# Patient Record
Sex: FEMALE | Race: WHITE | NOT HISPANIC OR LATINO | Employment: OTHER | URBAN - METROPOLITAN AREA
[De-identification: names, ages, dates, MRNs, and addresses within clinical notes are randomized per-mention and may not be internally consistent; named-entity substitution may affect disease eponyms.]

---

## 2017-01-16 ENCOUNTER — CONVERSION ENCOUNTER (OUTPATIENT)
Dept: CARDIOLOGY | Facility: CLINIC | Age: 82
End: 2017-01-16

## 2017-02-27 ENCOUNTER — CONVERSION ENCOUNTER (OUTPATIENT)
Dept: CARDIOLOGY | Facility: CLINIC | Age: 82
End: 2017-02-27

## 2017-03-27 ENCOUNTER — CONVERSION ENCOUNTER (OUTPATIENT)
Dept: CARDIOLOGY | Facility: CLINIC | Age: 82
End: 2017-03-27

## 2017-07-24 ENCOUNTER — CONVERSION ENCOUNTER (OUTPATIENT)
Dept: CARDIOLOGY | Facility: CLINIC | Age: 82
End: 2017-07-24

## 2018-04-11 ENCOUNTER — CONVERSION ENCOUNTER (OUTPATIENT)
Dept: CARDIOLOGY | Facility: CLINIC | Age: 83
End: 2018-04-11

## 2018-05-09 ENCOUNTER — CONVERSION ENCOUNTER (OUTPATIENT)
Dept: CARDIOLOGY | Facility: CLINIC | Age: 83
End: 2018-05-09

## 2018-05-22 ENCOUNTER — CONVERSION ENCOUNTER (OUTPATIENT)
Dept: CARDIOLOGY | Facility: CLINIC | Age: 83
End: 2018-05-22

## 2018-05-22 ENCOUNTER — HOSPITAL ENCOUNTER (OUTPATIENT)
Dept: ORTHOPEDIC SURGERY | Facility: CLINIC | Age: 83
Discharge: HOME | End: 2018-05-22
Attending: PHYSICIAN ASSISTANT | Admitting: PHYSICIAN ASSISTANT

## 2018-06-19 ENCOUNTER — CONVERSION ENCOUNTER (OUTPATIENT)
Dept: CARDIOLOGY | Facility: CLINIC | Age: 83
End: 2018-06-19

## 2018-06-19 ENCOUNTER — HOSPITAL ENCOUNTER (OUTPATIENT)
Dept: ORTHOPEDIC SURGERY | Facility: CLINIC | Age: 83
Discharge: HOME | End: 2018-06-19
Attending: ORTHOPAEDIC SURGERY | Admitting: ORTHOPAEDIC SURGERY

## 2018-11-12 ENCOUNTER — CONVERSION ENCOUNTER (OUTPATIENT)
Dept: CARDIOLOGY | Facility: CLINIC | Age: 83
End: 2018-11-12

## 2019-06-04 VITALS
DIASTOLIC BLOOD PRESSURE: 62 MMHG | BODY MASS INDEX: 21.63 KG/M2 | WEIGHT: 153 LBS | BODY MASS INDEX: 21.99 KG/M2 | HEART RATE: 61 BPM | DIASTOLIC BLOOD PRESSURE: 93 MMHG | HEART RATE: 65 BPM | HEART RATE: 65 BPM | BODY MASS INDEX: 25.16 KG/M2 | BODY MASS INDEX: 22.82 KG/M2 | WEIGHT: 129.8 LBS | WEIGHT: 143 LBS | SYSTOLIC BLOOD PRESSURE: 150 MMHG | WEIGHT: 132 LBS | WEIGHT: 137 LBS | DIASTOLIC BLOOD PRESSURE: 74 MMHG | HEART RATE: 70 BPM | BODY MASS INDEX: 22.33 KG/M2 | SYSTOLIC BLOOD PRESSURE: 157 MMHG | SYSTOLIC BLOOD PRESSURE: 178 MMHG | DIASTOLIC BLOOD PRESSURE: 61 MMHG | SYSTOLIC BLOOD PRESSURE: 188 MMHG | SYSTOLIC BLOOD PRESSURE: 176 MMHG | DIASTOLIC BLOOD PRESSURE: 78 MMHG | SYSTOLIC BLOOD PRESSURE: 186 MMHG | DIASTOLIC BLOOD PRESSURE: 93 MMHG | HEART RATE: 59 BPM | DIASTOLIC BLOOD PRESSURE: 84 MMHG | WEIGHT: 151 LBS | HEART RATE: 57 BPM | HEIGHT: 65 IN | HEIGHT: 65 IN | WEIGHT: 153 LBS | HEIGHT: 65 IN | SYSTOLIC BLOOD PRESSURE: 121 MMHG | HEIGHT: 65 IN | BODY MASS INDEX: 21.83 KG/M2 | HEART RATE: 56 BPM | HEIGHT: 65 IN | SYSTOLIC BLOOD PRESSURE: 122 MMHG | HEIGHT: 65 IN | WEIGHT: 131.2 LBS | SYSTOLIC BLOOD PRESSURE: 105 MMHG | HEART RATE: 67 BPM | DIASTOLIC BLOOD PRESSURE: 84 MMHG | HEIGHT: 65 IN | HEIGHT: 65 IN | BODY MASS INDEX: 25.49 KG/M2 | HEART RATE: 65 BPM | BODY MASS INDEX: 25.49 KG/M2 | BODY MASS INDEX: 23.82 KG/M2 | WEIGHT: 134 LBS | DIASTOLIC BLOOD PRESSURE: 81 MMHG

## 2019-09-13 PROBLEM — I50.9 CONGESTIVE HEART FAILURE (HCC): Status: ACTIVE | Noted: 2017-07-24

## 2019-09-13 PROBLEM — R60.0 EDEMA LEG: Status: ACTIVE | Noted: 2017-01-16

## 2019-09-13 PROBLEM — I10 HYPERTENSION: Status: ACTIVE | Noted: 2017-01-16

## 2019-09-13 RX ORDER — GABAPENTIN 100 MG/1
100 CAPSULE ORAL DAILY
Refills: 99 | COMMUNITY
Start: 2019-06-26

## 2019-09-13 RX ORDER — NICOTINE POLACRILEX 4 MG/1
1 GUM, CHEWING ORAL DAILY
COMMUNITY
Start: 2018-11-12 | End: 2019-09-16

## 2019-09-13 RX ORDER — HYDRALAZINE HYDROCHLORIDE 10 MG/1
1 TABLET, FILM COATED ORAL EVERY 8 HOURS
COMMUNITY
Start: 2018-11-12 | End: 2019-09-16

## 2019-09-13 RX ORDER — LISINOPRIL 10 MG/1
10 TABLET ORAL DAILY
Refills: 99 | COMMUNITY
Start: 2019-06-26 | End: 2020-06-22 | Stop reason: SDUPTHER

## 2019-09-13 RX ORDER — MELOXICAM 7.5 MG/1
1 TABLET ORAL DAILY
COMMUNITY
Start: 2018-11-12 | End: 2020-06-22

## 2019-09-13 RX ORDER — DOCUSATE SODIUM 100 MG/1
100 CAPSULE, LIQUID FILLED ORAL DAILY
Refills: 99 | COMMUNITY
Start: 2019-06-26

## 2019-09-13 RX ORDER — ATORVASTATIN CALCIUM 10 MG/1
1 TABLET, FILM COATED ORAL EVERY 24 HOURS
COMMUNITY
Start: 2018-11-12 | End: 2019-09-16

## 2019-09-13 RX ORDER — ROPINIROLE 2 MG/1
2 TABLET, FILM COATED ORAL 2 TIMES DAILY
Refills: 99 | COMMUNITY
Start: 2019-06-27

## 2019-09-13 RX ORDER — ESCITALOPRAM OXALATE 5 MG/1
5 TABLET ORAL
Refills: 99 | COMMUNITY
Start: 2019-07-09 | End: 2020-06-22

## 2019-09-13 RX ORDER — POTASSIUM CHLORIDE 20 MEQ/1
1 TABLET, EXTENDED RELEASE ORAL
COMMUNITY
Start: 2018-11-12 | End: 2019-09-16

## 2019-09-13 RX ORDER — DONEPEZIL HYDROCHLORIDE 5 MG/1
1 TABLET, FILM COATED ORAL EVERY 24 HOURS
COMMUNITY
Start: 2018-11-12 | End: 2019-09-16

## 2019-09-13 RX ORDER — TRAMADOL HYDROCHLORIDE 50 MG/1
1 TABLET ORAL DAILY
COMMUNITY
Start: 2018-11-12 | End: 2019-09-16

## 2019-09-13 RX ORDER — ASPIRIN 81 MG/1
81 TABLET, DELAYED RELEASE ORAL DAILY
Refills: 99 | COMMUNITY
Start: 2019-06-26

## 2019-09-13 RX ORDER — POTASSIUM CHLORIDE 750 MG/1
20 CAPSULE, EXTENDED RELEASE ORAL DAILY
Refills: 99 | COMMUNITY
Start: 2019-06-26 | End: 2022-03-21 | Stop reason: SDUPTHER

## 2019-09-13 RX ORDER — FUROSEMIDE 20 MG/1
20 TABLET ORAL 2 TIMES DAILY
Refills: 99 | COMMUNITY
Start: 2019-06-26 | End: 2020-08-10 | Stop reason: SDUPTHER

## 2019-09-16 ENCOUNTER — OFFICE VISIT (OUTPATIENT)
Dept: CARDIOLOGY | Facility: CLINIC | Age: 84
End: 2019-09-16

## 2019-09-16 VITALS
SYSTOLIC BLOOD PRESSURE: 129 MMHG | HEIGHT: 65 IN | DIASTOLIC BLOOD PRESSURE: 73 MMHG | HEART RATE: 67 BPM | BODY MASS INDEX: 21.16 KG/M2 | WEIGHT: 127 LBS

## 2019-09-16 DIAGNOSIS — I34.0 MITRAL VALVE INSUFFICIENCY, UNSPECIFIED ETIOLOGY: ICD-10-CM

## 2019-09-16 DIAGNOSIS — I50.9 CONGESTIVE HEART FAILURE, UNSPECIFIED HF CHRONICITY, UNSPECIFIED HEART FAILURE TYPE (HCC): Primary | ICD-10-CM

## 2019-09-16 DIAGNOSIS — R60.0 EDEMA LEG: ICD-10-CM

## 2019-09-16 DIAGNOSIS — I10 ESSENTIAL HYPERTENSION: ICD-10-CM

## 2019-09-16 PROCEDURE — 99214 OFFICE O/P EST MOD 30 MIN: CPT | Performed by: INTERNAL MEDICINE

## 2019-09-16 RX ORDER — OMEPRAZOLE 20 MG/1
20 CAPSULE, DELAYED RELEASE ORAL
COMMUNITY
End: 2019-09-16

## 2019-09-16 RX ORDER — FERROUS SULFATE 325(65) MG
65 TABLET ORAL
COMMUNITY
End: 2019-09-16

## 2019-09-16 RX ORDER — CHOLECALCIFEROL (VITAMIN D3) 125 MCG
5 CAPSULE ORAL
COMMUNITY
End: 2020-06-22

## 2019-09-16 RX ORDER — CYANOCOBALAMIN 1000 UG/ML
1000 INJECTION, SOLUTION INTRAMUSCULAR; SUBCUTANEOUS ONCE
COMMUNITY

## 2019-09-16 RX ORDER — CARBOXYMETHYLCELLULOSE SODIUM 5 MG/ML
SOLUTION/ DROPS OPHTHALMIC 3 TIMES DAILY PRN
COMMUNITY
End: 2020-06-22

## 2019-09-16 NOTE — PROGRESS NOTES
Date of Office Visit: 2019  Encounter Provider: Dr. Rodney Ramirez   Place of Service: [facility] CARDIOLOGY [place]  Patient Name: Mariaelena Zelaya  :1932  Ida Noguera MD    Chief Complaint   Patient presents with   • Hypertension  Mitral regurgitation     9 mo fu   • Congestive Heart Failure   • Edema     History of Present Illness    I am pleased to see Mrs. Zelaya in my office today as a follow-up    As you know, patient is 87-year-old white female whose past medical history significant for hypertension, who came today for follow-up    In 2016, she underwent two-dimensional echocardiogram for her symptom of shortness of breath and bilateral leg edema.  Patient was noted to have moderate to severe mitral regurgitation. In 2017, patient underwent transesophageal echogram which showed LVEF of 55-60%.  Moderate to severe left atrial enlargement and moderate mitral regurgitation was noted. Patient was started on diuretics and antihypertensives and medical treatment was recommended.    In 2018, patient was admitted at [facility] for possibility of left-sided weakness and possibility of TIA / CVA.  She had extensive workup and it was considered not to be stroke.  Patient underwent Holter monitor which showed no arrhythmia.  Medical treatment was recommended.    Since the previous visit, patient is overall stable.  She is getting frail and weak.  However she is able to walk short distance without any restriction or symptoms.  Patient denies any shortness of breath.  No orthopnea, PND, syncope or presyncope.  Patient is not in overt congestive heart failure.  Patient has trace leg edema.  However, her leg edema has improved dramatically.    Overall I am pleased with the patient progress.  I will continue current treatment.  Patient is advised to decrease salt and fluid intake.    Past Medical History:   Diagnosis Date   • Congenital heart disease    • Dizziness    •  Edema    • HTN (hypertension)    • Migraines    • Neck pain    • Pelvis fracture (CMS/HCC) 04/2018         Past Surgical History:   Procedure Laterality Date   • BACK SURGERY      5 times   • HIP SURGERY     • HYSTERECTOMY     • MENISCECTOMY             Current Outpatient Medications:   •  carboxymethylcellulose (REFRESH PLUS) 0.5 % solution, 3 (Three) Times a Day As Needed for Dry Eyes., Disp: , Rfl:   •  cyanocobalamin 1000 MCG/ML injection, Inject 1,000 mcg into the appropriate muscle as directed by prescriber 1 (One) Time., Disp: , Rfl:   •  docusate sodium (COLACE) 100 MG capsule, Take 100 mg by mouth Daily., Disp: , Rfl: 99  •  escitalopram (LEXAPRO) 5 MG tablet, Take 5 mg by mouth every night at bedtime., Disp: , Rfl: 99  •  furosemide (LASIX) 20 MG tablet, Take 20 mg by mouth Daily., Disp: , Rfl: 99  •  gabapentin (NEURONTIN) 100 MG capsule, Take 100 mg by mouth 2 (Two) Times a Day., Disp: , Rfl: 99  •  lisinopril (PRINIVIL,ZESTRIL) 10 MG tablet, Take 10 mg by mouth Daily., Disp: , Rfl: 99  •  melatonin 5 MG tablet tablet, Take 5 mg by mouth., Disp: , Rfl:   •  meloxicam (MOBIC) 7.5 MG tablet, 1 tablet Daily., Disp: , Rfl:   •  Multiple Vitamins-Minerals (MULTIVITAMIN ADULT PO), Take 1 tablet by mouth., Disp: , Rfl:   •  potassium chloride (MICRO-K) 10 MEQ CR capsule, Take 10 mEq by mouth Daily., Disp: , Rfl: 99  •  Pseudoephedrine-Acetaminophen  MG tablet, Take 650 mg by mouth., Disp: , Rfl:   •  rOPINIRole (REQUIP) 2 MG tablet, Take 2 mg by mouth 2 (Two) Times a Day., Disp: , Rfl: 99  •  SM ASPIRIN ADULT LOW STRENGTH 81 MG EC tablet, Take 81 mg by mouth Daily., Disp: , Rfl: 99  •  Cholecalciferol 1000 UNIT/10ML liquid, Take 2,000 Units by mouth., Disp: , Rfl:       Social History     Socioeconomic History   • Marital status:      Spouse name: Not on file   • Number of children: Not on file   • Years of education: Not on file   • Highest education level: Not on file   Tobacco Use   • Smoking  "status: Never Smoker   Substance and Sexual Activity   • Alcohol use: No     Frequency: Never   • Drug use: No   • Sexual activity: Defer         Review of Systems   Constitution: Negative for chills and fever.   HENT: Negative for ear discharge and nosebleeds.    Eyes: Negative for discharge and redness.   Cardiovascular: Positive for leg swelling. Negative for chest pain, orthopnea, palpitations, paroxysmal nocturnal dyspnea and syncope.   Respiratory: Positive for shortness of breath. Negative for cough and wheezing.    Endocrine: Negative for heat intolerance.   Skin: Negative for rash.   Musculoskeletal: Positive for arthritis and joint pain. Negative for myalgias.   Gastrointestinal: Negative for abdominal pain, melena, nausea and vomiting.   Genitourinary: Negative for dysuria and hematuria.   Neurological: Negative for dizziness, light-headedness, numbness and tremors.   Psychiatric/Behavioral: Negative for depression. The patient is not nervous/anxious.        Procedures    Procedures    No orders to display           Objective:    /73   Pulse 67   Ht 165.1 cm (65\")   Wt 57.6 kg (127 lb)   BMI 21.13 kg/m²         Physical Exam   Constitutional: She is oriented to person, place, and time.   Frail and weak   HENT:   Head: Normocephalic and atraumatic.   Eyes: No scleral icterus.   Neck: No thyromegaly present.   Cardiovascular: Normal rate and regular rhythm. Exam reveals no gallop and no friction rub.   Murmur heard.  Pulmonary/Chest: Effort normal and breath sounds normal. No respiratory distress. She has no wheezes. She has no rales.   Abdominal: There is no tenderness.   Musculoskeletal: She exhibits no edema.   Lymphadenopathy:     She has no cervical adenopathy.   Neurological: She is alert and oriented to person, place, and time.   Skin: No rash noted. No erythema.   Psychiatric: She has a normal mood and affect.           Assessment:       Diagnosis Plan   1. Congestive heart failure, " unspecified HF chronicity, unspecified heart failure type (CMS/[facility]AC, located [facility])     2. Essential hypertension     3. Edema leg     4. Mitral valve insufficiency, unspecified etiology              Plan:       Patient is clinically doing well.  She does not seem to be in significant congestive heart failure.  At this stage, I would continue current treatment.  Patient is advised to monitor the blood pressure at home.  Decrease salt and fluid intake discussed.  I intend to see the patient in 1 year.

## 2020-06-22 ENCOUNTER — OFFICE VISIT (OUTPATIENT)
Dept: CARDIOLOGY | Facility: CLINIC | Age: 85
End: 2020-06-22

## 2020-06-22 VITALS
HEIGHT: 65 IN | WEIGHT: 125 LBS | DIASTOLIC BLOOD PRESSURE: 62 MMHG | SYSTOLIC BLOOD PRESSURE: 92 MMHG | BODY MASS INDEX: 20.83 KG/M2 | HEART RATE: 59 BPM

## 2020-06-22 DIAGNOSIS — R60.0 EDEMA LEG: ICD-10-CM

## 2020-06-22 DIAGNOSIS — I34.0 MITRAL VALVE INSUFFICIENCY, UNSPECIFIED ETIOLOGY: ICD-10-CM

## 2020-06-22 DIAGNOSIS — I95.89 CHRONIC HYPOTENSION: ICD-10-CM

## 2020-06-22 DIAGNOSIS — I10 ESSENTIAL HYPERTENSION: ICD-10-CM

## 2020-06-22 DIAGNOSIS — I50.9 CONGESTIVE HEART FAILURE, UNSPECIFIED HF CHRONICITY, UNSPECIFIED HEART FAILURE TYPE (HCC): Primary | ICD-10-CM

## 2020-06-22 PROBLEM — R26.9 GAIT DISTURBANCE: Status: ACTIVE | Noted: 2018-09-24

## 2020-06-22 PROBLEM — G30.1 PRIMARY DEGENERATIVE DEMENTIA OF THE ALZHEIMER TYPE, SENILE ONSET (HCC): Status: ACTIVE | Noted: 2018-09-24

## 2020-06-22 PROBLEM — F02.80 PRIMARY DEGENERATIVE DEMENTIA OF THE ALZHEIMER TYPE, SENILE ONSET (HCC): Status: ACTIVE | Noted: 2018-09-24

## 2020-06-22 PROBLEM — R41.3 AMNESIA MEMORY LOSS: Status: ACTIVE | Noted: 2018-09-24

## 2020-06-22 PROCEDURE — 99214 OFFICE O/P EST MOD 30 MIN: CPT | Performed by: INTERNAL MEDICINE

## 2020-06-22 PROCEDURE — 93010 ELECTROCARDIOGRAM REPORT: CPT | Performed by: INTERNAL MEDICINE

## 2020-06-22 RX ORDER — IBUPROFEN 600 MG/1
600 TABLET ORAL EVERY 6 HOURS PRN
COMMUNITY
End: 2021-01-18

## 2020-06-22 RX ORDER — TRAMADOL HYDROCHLORIDE 50 MG/1
50 TABLET ORAL EVERY 6 HOURS PRN
COMMUNITY

## 2020-06-22 RX ORDER — ACETAMINOPHEN 500 MG
500 TABLET ORAL EVERY 6 HOURS PRN
COMMUNITY

## 2020-06-22 RX ORDER — CALCIUM CARBONATE 200(500)MG
1 TABLET,CHEWABLE ORAL DAILY
COMMUNITY

## 2020-06-22 RX ORDER — FAMOTIDINE 20 MG/1
20 TABLET, FILM COATED ORAL 2 TIMES DAILY
COMMUNITY
End: 2021-01-18 | Stop reason: SDUPTHER

## 2020-06-22 RX ORDER — LISINOPRIL 5 MG/1
5 TABLET ORAL DAILY
Qty: 90 TABLET | Refills: 1 | Status: SHIPPED | OUTPATIENT
Start: 2020-06-22 | End: 2021-05-25

## 2020-06-22 NOTE — PROGRESS NOTES
Date of Office Visit: 2020  Encounter Provider: Dr. Rodney Ramirez    Place of Service: [facility] CARDIOLOGY [place]  Patient Name: Mariaelena Zelaya  :1932  Ida Noguera MD    Chief Complaint   Patient presents with   • Congestive Heart Failure     9 month    • Hypertension     History of Present Illness    As you know, patient is 88-year-old white female whose past medical history significant for hypertension, who came today for follow-up    In 2016, she underwent two-dimensional echocardiogram for her symptom of shortness of breath and bilateral leg edema.  Patient was noted to have moderate to severe mitral regurgitation. In 2017, patient underwent transesophageal echogram which showed LVEF of 55-60%.  Moderate to severe left atrial enlargement and moderate mitral regurgitation was noted. Patient was started on diuretics and antihypertensives and medical treatment was recommended.    In 2018, patient was admitted at [facility] for possibility of left-sided weakness and possibility of TIA / CVA.  She had extensive workup and it was considered not to be stroke.  Patient underwent Holter monitor which showed no arrhythmia.  Medical treatment was recommended.    Since the previous visit, patient overall is stable.  Patient is very limited in her activities.  She lives in [facility].  Patient has trace leg edema.  She is mildly short of breath.  Patient denies any orthopnea or PND.  Patient does have headaches.  Her blood pressure is slightly low.    EKG showed sinus bradycardia with heart rate of 59 bpm.  Borderline first-degree AV block is noted.    At this stage, I would recommend to decrease lisinopril to 5 mg daily.  I have also called to [facility], [facility].  Blood pressure monitoring is emphasized.  I will see the patient in 6 months.      Past Medical History:   Diagnosis Date   • Congenital heart disease    • Dizziness    • Edema    • HTN  (hypertension)    • Migraines    • Neck pain    • Pelvis fracture (CMS/HCC) 04/2018         Past Surgical History:   Procedure Laterality Date   • BACK SURGERY      5 times   • HIP SURGERY     • HYSTERECTOMY     • MENISCECTOMY             Current Outpatient Medications:   •  acetaminophen (TYLENOL) 500 MG tablet, Take 500 mg by mouth Every 6 (Six) Hours As Needed for Mild Pain ., Disp: , Rfl:   •  calcium carbonate (TUMS) 500 MG chewable tablet, Chew 1 tablet Daily., Disp: , Rfl:   •  cyanocobalamin 1000 MCG/ML injection, Inject 1,000 mcg into the appropriate muscle as directed by prescriber 1 (One) Time., Disp: , Rfl:   •  docusate sodium (COLACE) 100 MG capsule, Take 100 mg by mouth Daily., Disp: , Rfl: 99  •  famotidine (PEPCID) 20 MG tablet, Take 20 mg by mouth 2 (Two) Times a Day., Disp: , Rfl:   •  furosemide (LASIX) 20 MG tablet, Take 20 mg by mouth 2 (Two) Times a Day., Disp: , Rfl: 99  •  gabapentin (NEURONTIN) 100 MG capsule, Take 100 mg by mouth 2 (Two) Times a Day., Disp: , Rfl: 99  •  ibuprofen (ADVIL,MOTRIN) 600 MG tablet, Take 600 mg by mouth Every 6 (Six) Hours As Needed for Mild Pain ., Disp: , Rfl:   •  lisinopril (PRINIVIL,ZESTRIL) 5 MG tablet, Take 1 tablet by mouth Daily., Disp: 90 tablet, Rfl: 1  •  Menthol, Topical Analgesic, (BIOFREEZE EX), Apply  topically., Disp: , Rfl:   •  potassium chloride (MICRO-K) 10 MEQ CR capsule, Take 20 mEq by mouth Daily., Disp: , Rfl: 99  •  rOPINIRole (REQUIP) 2 MG tablet, Take 2 mg by mouth 2 (Two) Times a Day., Disp: , Rfl: 99  •  SM ASPIRIN ADULT LOW STRENGTH 81 MG EC tablet, Take 81 mg by mouth Daily., Disp: , Rfl: 99  •  traMADol (ULTRAM) 50 MG tablet, Take 50 mg by mouth Every 6 (Six) Hours As Needed for Moderate Pain ., Disp: , Rfl:       Social History     Socioeconomic History   • Marital status:      Spouse name: Not on file   • Number of children: Not on file   • Years of education: Not on file   • Highest education level: Not on file  "  Tobacco Use   • Smoking status: Never Smoker   • Smokeless tobacco: Never Used   Substance and Sexual Activity   • Alcohol use: No     Frequency: Never   • Drug use: No   • Sexual activity: Defer         Review of Systems   Constitution: Positive for decreased appetite and malaise/fatigue. Negative for chills and fever.   HENT: Negative for ear discharge and nosebleeds.    Eyes: Negative for discharge and redness.   Cardiovascular: Negative for chest pain, orthopnea, palpitations, paroxysmal nocturnal dyspnea and syncope.   Respiratory: Negative for cough, shortness of breath and wheezing.    Endocrine: Negative for heat intolerance.   Skin: Negative for rash.   Musculoskeletal: Positive for arthritis and joint pain. Negative for myalgias.   Gastrointestinal: Negative for abdominal pain, melena, nausea and vomiting.   Genitourinary: Negative for dysuria and hematuria.   Neurological: Negative for dizziness, light-headedness, numbness and tremors.   Psychiatric/Behavioral: Negative for depression. The patient is not nervous/anxious.        Procedures    Procedures    No orders to display           Objective:    BP 92/62   Pulse 59   Ht 165.1 cm (65\")   Wt 56.7 kg (125 lb)   BMI 20.80 kg/m²         Physical Exam   Constitutional: She is oriented to person, place, and time. She appears well-developed and well-nourished.   HENT:   Head: Normocephalic and atraumatic.   Eyes: No scleral icterus.   Neck: No thyromegaly present.   Cardiovascular: Normal rate and regular rhythm. Exam reveals no gallop and no friction rub.   Murmur heard.  Pulmonary/Chest: Effort normal and breath sounds normal. No respiratory distress. She has no wheezes. She has no rales.   Abdominal: There is no tenderness.   Musculoskeletal: She exhibits edema.   Lymphadenopathy:     She has no cervical adenopathy.   Neurological: She is alert and oriented to person, place, and time.   Skin: No rash noted. No erythema.   Psychiatric: She has a " normal mood and affect.           Assessment:       Diagnosis Plan   1. Congestive heart failure, unspecified HF chronicity, unspecified heart failure type (CMS/HCC)  lisinopril (PRINIVIL,ZESTRIL) 5 MG tablet   2. Essential hypertension  lisinopril (PRINIVIL,ZESTRIL) 5 MG tablet   3. Edema leg  lisinopril (PRINIVIL,ZESTRIL) 5 MG tablet   4. Chronic hypotension  lisinopril (PRINIVIL,ZESTRIL) 5 MG tablet   5. Mitral valve insufficiency, unspecified etiology              Plan:       At this stage, I would decrease lisinopril to 5 mg daily.  Continue current therapy.  I would not be aggressive for any invasive cardiac work-up in this lady.

## 2020-08-10 RX ORDER — FUROSEMIDE 20 MG/1
20 TABLET ORAL DAILY
Qty: 30 TABLET | Refills: 1 | Status: SHIPPED | OUTPATIENT
Start: 2020-08-10

## 2021-01-18 ENCOUNTER — OFFICE VISIT (OUTPATIENT)
Dept: CARDIOLOGY | Facility: CLINIC | Age: 86
End: 2021-01-18

## 2021-01-18 VITALS
HEART RATE: 57 BPM | SYSTOLIC BLOOD PRESSURE: 137 MMHG | BODY MASS INDEX: 21.09 KG/M2 | OXYGEN SATURATION: 96 % | WEIGHT: 126.6 LBS | DIASTOLIC BLOOD PRESSURE: 70 MMHG | HEIGHT: 65 IN

## 2021-01-18 DIAGNOSIS — R60.0 EDEMA LEG: ICD-10-CM

## 2021-01-18 DIAGNOSIS — I50.9 CONGESTIVE HEART FAILURE, UNSPECIFIED HF CHRONICITY, UNSPECIFIED HEART FAILURE TYPE (HCC): Primary | ICD-10-CM

## 2021-01-18 DIAGNOSIS — I10 ESSENTIAL HYPERTENSION: ICD-10-CM

## 2021-01-18 PROCEDURE — 93010 ELECTROCARDIOGRAM REPORT: CPT | Performed by: INTERNAL MEDICINE

## 2021-01-18 PROCEDURE — 99214 OFFICE O/P EST MOD 30 MIN: CPT | Performed by: INTERNAL MEDICINE

## 2021-01-18 RX ORDER — ESCITALOPRAM OXALATE 5 MG/1
5 TABLET ORAL NIGHTLY
COMMUNITY
End: 2022-03-21

## 2021-01-18 RX ORDER — FAMOTIDINE 40 MG/1
40 TABLET, FILM COATED ORAL DAILY
COMMUNITY
Start: 2021-01-06

## 2021-01-18 RX ORDER — AZITHROMYCIN 250 MG/1
250 TABLET, FILM COATED ORAL DAILY
COMMUNITY
End: 2022-03-18

## 2021-01-18 RX ORDER — GABAPENTIN 300 MG/1
300 CAPSULE ORAL NIGHTLY
COMMUNITY
Start: 2020-12-21

## 2021-01-18 NOTE — PROGRESS NOTES
Date of Office Visit: 2021  Encounter Provider: Dr. Rodney Ramirez    Place of Service: [facility] CARDIOLOGY [place]  Patient Name: Mariaelena Zelaya  :1932  Ida Noguera MD    Chief Complaint   Patient presents with   • Follow-up   • Congestive Heart Failure   • Hypertension     History of Present Illness    As you know, patient is 88-year-old white female whose past medical history significant for hypertension, who came today for follow-up    In 2016, she underwent two-dimensional echocardiogram for her symptom of shortness of breath and bilateral leg edema.  Patient was noted to have moderate to severe mitral regurgitation. In 2017, patient underwent transesophageal echogram which showed LVEF of 55-60%.  Moderate to severe left atrial enlargement and moderate mitral regurgitation was noted. Patient was started on diuretics and antihypertensives and medical treatment was recommended.    In 2018, patient was admitted at [facility] for possibility of left-sided weakness and possibility of TIA / CVA.  She had extensive workup and it was considered not to be stroke.  Patient underwent Holter monitor which showed no arrhythmia.    In , repeat echocardiogram showed EF of 55 to 60% and moderate mitral regurgitation was noted.    In 2020, patient was noted to have low blood pressure on visit in the office.  I recommended to decrease lisinopril to 5 mg daily instead of 10 mg daily.  Her blood pressure has improved.  Patient is not very active.  She can transfer her weight but she is unable to walk any significant distance.  She is essentially wheelchair-bound.  Patient denies any orthopnea or PND.  No leg edema.      EKG showed normal sinus rhythm with heart rate of 57 bpm    At this stage, I would recommend that patient should proceed with current treatment.  I would repeat echocardiogram on next visit.  I will see the patient in 1 year        Past Medical  History:   Diagnosis Date   • Congenital heart disease    • Dizziness    • Edema    • HTN (hypertension)    • Migraines    • Neck pain    • Pelvis fracture (CMS/HCC) 04/2018         Past Surgical History:   Procedure Laterality Date   • BACK SURGERY      5 times   • HIP SURGERY     • HYSTERECTOMY     • MENISCECTOMY             Current Outpatient Medications:   •  acetaminophen (TYLENOL) 500 MG tablet, Take 500 mg by mouth Every 6 (Six) Hours As Needed for Mild Pain ., Disp: , Rfl:   •  azithromycin (Zithromax Z-Shiraz) 250 MG tablet, Take 250 mg by mouth Daily., Disp: , Rfl:   •  calcium carbonate (TUMS) 500 MG chewable tablet, Chew 1 tablet Daily., Disp: , Rfl:   •  cyanocobalamin 1000 MCG/ML injection, Inject 1,000 mcg into the appropriate muscle as directed by prescriber 1 (One) Time., Disp: , Rfl:   •  docusate sodium (COLACE) 100 MG capsule, Take 100 mg by mouth Daily., Disp: , Rfl: 99  •  escitalopram (LEXAPRO) 5 MG tablet, Take 5 mg by mouth Every Night., Disp: , Rfl:   •  famotidine (PEPCID) 40 MG tablet, Take 40 mg by mouth Daily., Disp: , Rfl:   •  furosemide (LASIX) 20 MG tablet, Take 1 tablet by mouth Daily. (Patient taking differently: Take 60 mg by mouth Daily.), Disp: 30 tablet, Rfl: 1  •  gabapentin (NEURONTIN) 100 MG capsule, Take 100 mg by mouth Daily., Disp: , Rfl: 99  •  gabapentin (NEURONTIN) 300 MG capsule, Take 300 mg by mouth Every Night., Disp: , Rfl:   •  lisinopril (PRINIVIL,ZESTRIL) 5 MG tablet, Take 1 tablet by mouth Daily., Disp: 90 tablet, Rfl: 1  •  magnesium hydroxide (MILK OF MAGNESIA) 2400 MG/10ML suspension suspension, Take 10 mL by mouth Daily As Needed., Disp: , Rfl:   •  Menthol, Topical Analgesic, (BIOFREEZE EX), Apply  topically., Disp: , Rfl:   •  potassium chloride (MICRO-K) 10 MEQ CR capsule, Take 20 mEq by mouth Daily., Disp: , Rfl: 99  •  rOPINIRole (REQUIP) 2 MG tablet, Take 2 mg by mouth 2 (Two) Times a Day., Disp: , Rfl: 99  •  SM ASPIRIN ADULT LOW STRENGTH 81 MG EC  "tablet, Take 81 mg by mouth Daily., Disp: , Rfl: 99  •  traMADol (ULTRAM) 50 MG tablet, Take 50 mg by mouth Every 6 (Six) Hours As Needed for Moderate Pain ., Disp: , Rfl:       Social History     Socioeconomic History   • Marital status:      Spouse name: Not on file   • Number of children: Not on file   • Years of education: Not on file   • Highest education level: Not on file   Tobacco Use   • Smoking status: Never Smoker   • Smokeless tobacco: Never Used   Substance and Sexual Activity   • Alcohol use: No     Frequency: Never   • Drug use: No   • Sexual activity: Defer         Review of Systems   Constitution: Positive for malaise/fatigue. Negative for chills and fever.   HENT: Negative for ear discharge and nosebleeds.    Eyes: Negative for discharge and redness.   Cardiovascular: Negative for chest pain, orthopnea, palpitations, paroxysmal nocturnal dyspnea and syncope.   Respiratory: Positive for shortness of breath. Negative for cough and wheezing.    Endocrine: Negative for heat intolerance.   Skin: Negative for rash.   Musculoskeletal: Positive for arthritis and joint pain. Negative for myalgias.   Gastrointestinal: Negative for abdominal pain, melena, nausea and vomiting.   Genitourinary: Negative for dysuria and hematuria.   Neurological: Negative for dizziness, light-headedness, numbness and tremors.   Psychiatric/Behavioral: Negative for depression. The patient is not nervous/anxious.        Procedures    Procedures    No orders to display           Objective:    /70   Pulse 57   Ht 165.1 cm (65\")   Wt 57.4 kg (126 lb 9.6 oz)   SpO2 96%   BMI 21.07 kg/m²         Constitutional:       Appearance: Well-developed.   Eyes:      General: No scleral icterus.        Right eye: No discharge.   HENT:      Head: Normocephalic and atraumatic.   Neck:      Thyroid: No thyromegaly.      Lymphadenopathy: No cervical adenopathy.   Pulmonary:      Effort: Pulmonary effort is normal. No respiratory " distress.      Breath sounds: Normal breath sounds. No wheezing. No rales.   Cardiovascular:      Normal rate. Regular rhythm.      Murmurs: There is a grade 2/6 systolic murmur.      No gallop.   Abdominal:      Tenderness: There is no abdominal tenderness.   Skin:     Findings: No erythema or rash.   Neurological:      Mental Status: Alert and oriented to person, place, and time.             Assessment:       Diagnosis Plan   1. Congestive heart failure, unspecified HF chronicity, unspecified heart failure type (CMS/HCC)     2. Edema leg     3. Essential hypertension              Plan:       MDM/assessment and plan:    1.  Hypotension:    Since decreasing lisinopril, blood pressure is much better.  Continue current therapy.    2.  Congestive heart failure:    Patient has history of congestive heart failure but today she is in euvolemic status and no evidence of congestive heart failure.    3.  Mitral regurgitation:    Patient has moderate mitral regurgitation which could be contributing to her shortness of breath but she is not a candidate for surgical intervention.    4.  Hypertension:    Patient is on lisinopril I will continue current treatment.  Blood pressure and creatinine monitoring should be done.  I will leave that decision to PCP and medical director of [facility].

## 2021-05-25 DIAGNOSIS — I95.89 CHRONIC HYPOTENSION: ICD-10-CM

## 2021-05-25 DIAGNOSIS — I50.9 CONGESTIVE HEART FAILURE, UNSPECIFIED HF CHRONICITY, UNSPECIFIED HEART FAILURE TYPE (HCC): ICD-10-CM

## 2021-05-25 DIAGNOSIS — I10 ESSENTIAL HYPERTENSION: ICD-10-CM

## 2021-05-25 DIAGNOSIS — R60.0 EDEMA LEG: ICD-10-CM

## 2021-05-25 RX ORDER — LISINOPRIL 5 MG/1
TABLET ORAL
Qty: 90 TABLET | Refills: 0 | Status: SHIPPED | OUTPATIENT
Start: 2021-05-25

## 2022-03-18 RX ORDER — FERROUS SULFATE 325(65) MG
1 TABLET ORAL 2 TIMES DAILY
COMMUNITY
Start: 2022-02-20 | End: 2022-03-21

## 2022-03-18 NOTE — PROGRESS NOTES
Date of Office Visit: 2022  Encounter Provider: Dr. Rodney Ramirez    Place of Service: [facility] CARDIOLOGY [place]  Patient Name: Mariaelena Zelaya  :1932  Ida Noguera MD    Chief Complaint   Patient presents with   • Congestive Heart Failure   • Hypertension     History of Present Illness    As you know, patient is 89-year-old white female whose past medical history significant for hypertension, who came today for follow-up    In 2016, she underwent two-dimensional echocardiogram for her symptom of shortness of breath and bilateral leg edema.  Patient was noted to have moderate to severe mitral regurgitation. In 2017, patient underwent transesophageal echogram which showed LVEF of 55-60%.  Moderate to severe left atrial enlargement and moderate mitral regurgitation was noted. Patient was started on diuretics and antihypertensives and medical treatment was recommended.    In 2018, patient was admitted at [facility] for possibility of left-sided weakness and possibility of TIA / CVA.  She had extensive workup and it was considered not to be stroke.  Patient underwent Holter monitor which showed no arrhythmia.    In , repeat echocardiogram showed EF of 55 to 60% and moderate mitral regurgitation was noted.    This is a yearly follow-up for the patient.  She is a resident of [facility].  Dr. Garcia follow the patient with me.  Patient is able to walk with a walker.  She is very frail and weak.  Patient denies any chest pain.  No fall.  No orthopnea PND.  Trace leg edema noted.    EKG showed normal sinus rhythm.  No ST changes suggestive of ischemia.    Her blood pressure at nursing home is desirable but today it is slightly low.  I advised to continue to observe.  If needed cut back on lisinopril.  I will leave that decision to nursing home physician.  Patient has moderate mitral regurgitation.  She is in euvolemic status and not in congestive  heart failure.  She is not a candidate for any kind of invasive cardiac work-up.  I have discussed in detail with the patient in the past and the family.  At this stage I will see the patient as needed..      Past Medical History:   Diagnosis Date   • Congenital heart disease    • Dizziness    • Edema    • HTN (hypertension)    • Migraines    • Neck pain    • Pelvis fracture (HCC) 04/2018         Past Surgical History:   Procedure Laterality Date   • BACK SURGERY      5 times   • HIP SURGERY     • HYSTERECTOMY     • MENISCECTOMY             Current Outpatient Medications:   •  acetaminophen (TYLENOL) 500 MG tablet, Take 500 mg by mouth Every 6 (Six) Hours As Needed for Mild Pain ., Disp: , Rfl:   •  calcium carbonate (TUMS) 500 MG chewable tablet, Chew 1 tablet Daily., Disp: , Rfl:   •  cyanocobalamin 1000 MCG/ML injection, Inject 1,000 mcg into the appropriate muscle as directed by prescriber 1 (One) Time., Disp: , Rfl:   •  docusate sodium (COLACE) 100 MG capsule, Take 100 mg by mouth Daily., Disp: , Rfl: 99  •  famotidine (PEPCID) 40 MG tablet, Take 40 mg by mouth Daily., Disp: , Rfl:   •  furosemide (LASIX) 20 MG tablet, Take 1 tablet by mouth Daily. (Patient taking differently: Take 60 mg by mouth Daily.), Disp: 30 tablet, Rfl: 1  •  gabapentin (NEURONTIN) 100 MG capsule, Take 100 mg by mouth Daily., Disp: , Rfl: 99  •  gabapentin (NEURONTIN) 300 MG capsule, Take 300 mg by mouth Every Night., Disp: , Rfl:   •  lisinopril (PRINIVIL,ZESTRIL) 5 MG tablet, Take 1 tablet by mouth once daily, Disp: 90 tablet, Rfl: 0  •  Menthol, Topical Analgesic, (BIOFREEZE EX), Apply  topically., Disp: , Rfl:   •  potassium chloride ER (K-TAB) 20 MEQ tablet controlled-release ER tablet, Take 20 mEq by mouth Daily., Disp: , Rfl:   •  rOPINIRole (REQUIP) 2 MG tablet, Take 2 mg by mouth 2 (Two) Times a Day., Disp: , Rfl: 99  •  SM ASPIRIN ADULT LOW STRENGTH 81 MG EC tablet, Take 81 mg by mouth Daily., Disp: , Rfl: 99  •  traMADol  "(ULTRAM) 50 MG tablet, Take 50 mg by mouth Every 6 (Six) Hours As Needed for Moderate Pain ., Disp: , Rfl:       Social History     Socioeconomic History   • Marital status:    Tobacco Use   • Smoking status: Never Smoker   • Smokeless tobacco: Never Used   Vaping Use   • Vaping Use: Never used   Substance and Sexual Activity   • Alcohol use: No   • Drug use: No   • Sexual activity: Defer         Review of Systems   Constitutional: Negative for chills and fever.   HENT: Negative for ear discharge and nosebleeds.    Eyes: Negative for discharge and redness.   Cardiovascular: Negative for chest pain, orthopnea, palpitations, paroxysmal nocturnal dyspnea and syncope.   Respiratory: Positive for shortness of breath. Negative for cough and wheezing.    Endocrine: Negative for heat intolerance.   Skin: Negative for rash.   Musculoskeletal: Positive for arthritis and joint pain. Negative for myalgias.   Gastrointestinal: Negative for abdominal pain, melena, nausea and vomiting.   Genitourinary: Negative for dysuria and hematuria.   Neurological: Negative for dizziness, light-headedness, numbness and tremors.   Psychiatric/Behavioral: Negative for depression. The patient is not nervous/anxious.        Procedures    Procedures    No orders to display           Objective:    /57   Pulse 64   Ht 165.1 cm (65\")   Wt 59 kg (130 lb)   SpO2 97%   BMI 21.63 kg/m²         Constitutional:       Appearance: Well-developed.   Eyes:      General: No scleral icterus.        Right eye: No discharge.   HENT:      Head: Normocephalic and atraumatic.   Neck:      Thyroid: No thyromegaly.      Lymphadenopathy: No cervical adenopathy.   Pulmonary:      Effort: Pulmonary effort is normal. No respiratory distress.      Breath sounds: Normal breath sounds. No wheezing. No rales.   Cardiovascular:      Normal rate. Regular rhythm.      Murmurs: There is a systolic murmur.      No gallop.   Abdominal:      Tenderness: There is no " abdominal tenderness.   Skin:     Findings: No erythema or rash.   Neurological:      Mental Status: Alert and oriented to person, place, and time.             Assessment:       Diagnosis Plan   1. Congestive heart failure, unspecified HF chronicity, unspecified heart failure type (HCC)     2. Primary hypertension              Plan:       MDM:    1.  Mitral regurgitation:    Patient has moderate mitral regurgitation.  She is not a candidate for any invasive cardiac work-up.  Recommend medical treatment    2.  Congestive heart failure acute on chronic diastolic:    Patient is in chronic diastolic congestive heart failure but at present she is in euvolemic status.  Continue gentle diuresis    3.  Hypotension:    Her blood pressure is low.  Previously I have advised to decrease lisinopril to 5 mg daily.  This probably needs to be decreased further if blood pressure continues to be low but apparently blood pressure at nursing home is desirable

## 2022-03-21 ENCOUNTER — OFFICE VISIT (OUTPATIENT)
Dept: CARDIOLOGY | Facility: CLINIC | Age: 87
End: 2022-03-21

## 2022-03-21 VITALS
HEART RATE: 64 BPM | HEIGHT: 65 IN | OXYGEN SATURATION: 97 % | SYSTOLIC BLOOD PRESSURE: 103 MMHG | BODY MASS INDEX: 21.66 KG/M2 | WEIGHT: 130 LBS | DIASTOLIC BLOOD PRESSURE: 57 MMHG

## 2022-03-21 DIAGNOSIS — I10 PRIMARY HYPERTENSION: ICD-10-CM

## 2022-03-21 DIAGNOSIS — I50.9 CONGESTIVE HEART FAILURE, UNSPECIFIED HF CHRONICITY, UNSPECIFIED HEART FAILURE TYPE: Primary | ICD-10-CM

## 2022-03-21 PROCEDURE — 99214 OFFICE O/P EST MOD 30 MIN: CPT | Performed by: INTERNAL MEDICINE

## 2022-03-21 RX ORDER — POTASSIUM CHLORIDE 1500 MG/1
20 TABLET, FILM COATED, EXTENDED RELEASE ORAL DAILY
COMMUNITY
Start: 2022-03-16